# Patient Record
Sex: MALE | Race: WHITE | NOT HISPANIC OR LATINO | Employment: OTHER | URBAN - METROPOLITAN AREA
[De-identification: names, ages, dates, MRNs, and addresses within clinical notes are randomized per-mention and may not be internally consistent; named-entity substitution may affect disease eponyms.]

---

## 2020-07-08 ENCOUNTER — OFFICE VISIT (OUTPATIENT)
Dept: NEUROSURGERY | Facility: CLINIC | Age: 50
End: 2020-07-08

## 2020-07-08 VITALS — BODY MASS INDEX: 33.16 KG/M2 | RESPIRATION RATE: 15 BRPM | TEMPERATURE: 96.9 F | HEIGHT: 72 IN | WEIGHT: 244.8 LBS

## 2020-07-08 DIAGNOSIS — M47.819 FACET ARTHROPATHY: ICD-10-CM

## 2020-07-08 DIAGNOSIS — M54.9 MECHANICAL BACK PAIN: Primary | ICD-10-CM

## 2020-07-08 DIAGNOSIS — M51.36 DDD (DEGENERATIVE DISC DISEASE), LUMBAR: ICD-10-CM

## 2020-07-08 DIAGNOSIS — Z98.890 HX OF LUMBAR DISCECTOMY: ICD-10-CM

## 2020-07-08 PROCEDURE — 99243 OFF/OP CNSLTJ NEW/EST LOW 30: CPT | Performed by: NEUROLOGICAL SURGERY

## 2020-07-08 RX ORDER — TIZANIDINE 4 MG/1
TABLET ORAL
COMMUNITY
Start: 2020-06-22

## 2020-07-08 RX ORDER — MELOXICAM 15 MG/1
TABLET ORAL
COMMUNITY
Start: 2020-06-22

## 2020-07-08 RX ORDER — LOSARTAN POTASSIUM 25 MG/1
TABLET ORAL
COMMUNITY
Start: 2020-06-22

## 2020-07-08 RX ORDER — GABAPENTIN 800 MG/1
TABLET ORAL
COMMUNITY
Start: 2020-06-22

## 2020-07-08 RX ORDER — ATORVASTATIN CALCIUM 20 MG/1
TABLET, FILM COATED ORAL
COMMUNITY
Start: 2020-06-29

## 2020-07-08 RX ORDER — HYDROCODONE BITARTRATE AND ACETAMINOPHEN 5; 325 MG/1; MG/1
1 TABLET ORAL EVERY 6 HOURS PRN
COMMUNITY

## 2020-07-08 NOTE — PROGRESS NOTES
Patient: Toni Perea  : 1970    Primary Care Provider: Albert Eisenberg PA    Requesting Provider: As above        History    Chief Complaint: Low back pain with numbness in the right leg and feet.    History of Present Illness: Mr. Perea is a 49-year-old  who is known to our service.  In  and then again in  he underwent lumbar surgery by a couple of my colleagues.  He has chronic intermittent low back pain.  He tends to have flareups.  NSAIDs have been helpful but he has recently been diagnosed with stage III renal failure and believes that he will need to discontinue his meloxicam.  Symptoms are worse with sitting.  He does better lying down.  He has no leg pain but does have some rather chronic numbness in the side of his right calf and in his feet.  Neurontin is helpful for the sensory alteration.    Review of Systems   Constitutional: Negative for activity change, appetite change, chills, diaphoresis, fatigue, fever and unexpected weight change.   HENT: Negative for congestion, dental problem, drooling, ear discharge, ear pain, facial swelling, hearing loss, mouth sores, nosebleeds, postnasal drip, rhinorrhea, sinus pressure, sneezing, sore throat, tinnitus, trouble swallowing and voice change.    Eyes: Negative for photophobia, pain, discharge, redness, itching and visual disturbance.   Respiratory: Negative for apnea, cough, choking, chest tightness, shortness of breath, wheezing and stridor.    Cardiovascular: Negative for chest pain, palpitations and leg swelling.   Gastrointestinal: Negative for abdominal distention, abdominal pain, anal bleeding, blood in stool, constipation, diarrhea, nausea, rectal pain and vomiting.   Endocrine: Negative for cold intolerance, heat intolerance, polydipsia, polyphagia and polyuria.   Genitourinary: Negative for decreased urine volume, difficulty urinating, dysuria, enuresis, flank pain, frequency, genital sores, hematuria and  "urgency.   Musculoskeletal: Positive for back pain. Negative for arthralgias, gait problem, joint swelling, myalgias, neck pain and neck stiffness.   Skin: Negative for color change, pallor, rash and wound.   Allergic/Immunologic: Negative for environmental allergies, food allergies and immunocompromised state.   Neurological: Positive for weakness and numbness. Negative for dizziness, tremors, seizures, syncope, facial asymmetry, speech difficulty, light-headedness and headaches.   Hematological: Negative for adenopathy. Does not bruise/bleed easily.   Psychiatric/Behavioral: Negative for agitation, behavioral problems, confusion, decreased concentration, dysphoric mood, hallucinations, self-injury, sleep disturbance and suicidal ideas. The patient is not nervous/anxious and is not hyperactive.    All other systems reviewed and are negative.      The patient's past medical history, past surgical history, family history, and social history have been reviewed at length in the electronic medical record.    Physical Exam:   Temp 96.9 °F (36.1 °C) (Temporal)   Resp 15   Ht 182.9 cm (72\")   Wt 111 kg (244 lb 12.8 oz)   BMI 33.20 kg/m²   CONSTITUTIONAL: Patient is well-nourished, pleasant and appears stated age.  CV: Heart regular rate and rhythm without murmur, rub, or gallop.  PULMONARY: Lungs are clear to ascultation.  MUSCULOSKELETAL:  Straight leg raising is negative.  Lukas's Sign is negative.  ROM in back normal.  Tenderness in the back to palpation is not observed.  Well-healed midline lumbar incision is noted.  NEUROLOGICAL:  Orientation, memory, attention span, language function, and cognition have been examined and are intact.  Strength is intact in the lower extremities to direct testing.  Muscle tone is normal throughout.  Station and gait are normal.  Sensation is intact to light touch testing throughout.  Deep tendon reflexes are 2+ and symmetrical.  Coordination is intact.      Medical Decision " Making    Data Review:   MRI of the lumbar spine dated 6/25/2020 demonstrates some diffuse degenerative disc disease and facet arthropathy.  Laminotomy defect is noted at L4-5.  He does have a transitional segment.  There is some disc or scar midline to rightward at L4-5 that was noted by 1 of my colleagues in 2007.    Diagnosis:   The patient has predominantly mechanical low back pain.  He does not complain of radicular pain.  The numbness in his leg is quite old.    Treatment Options:   I do not see a role for surgical intervention.  I am going to refer him to the pain clinic for some facet blocks.  He will follow-up as needed.       Diagnosis Plan   1. Mechanical back pain  Ambulatory Referral to Pain Management   2. DDD (degenerative disc disease), lumbar     3. Facet arthropathy     4. Hx of lumbar discectomy         Scribed for Ramesh Barclay MD by Shelbie Samano CMA on 7/8/2020 09:48       I, Dr. Barclay, personally performed the services described in the documentation, as scribed in my presence, and it is both accurate and complete.

## 2020-07-15 PROBLEM — M96.1 LUMBAR POSTLAMINECTOMY SYNDROME: Status: ACTIVE | Noted: 2020-07-15

## 2020-07-15 PROBLEM — M47.816 SPONDYLOSIS OF LUMBAR REGION WITHOUT MYELOPATHY OR RADICULOPATHY: Status: ACTIVE | Noted: 2020-07-15

## 2020-07-15 PROBLEM — M51.37 DEGENERATION OF LUMBAR OR LUMBOSACRAL INTERVERTEBRAL DISC: Status: ACTIVE | Noted: 2020-07-15
